# Patient Record
Sex: FEMALE | Race: WHITE | NOT HISPANIC OR LATINO | Employment: FULL TIME | URBAN - METROPOLITAN AREA
[De-identification: names, ages, dates, MRNs, and addresses within clinical notes are randomized per-mention and may not be internally consistent; named-entity substitution may affect disease eponyms.]

---

## 2023-06-01 ENCOUNTER — HOSPITAL ENCOUNTER (EMERGENCY)
Facility: HOSPITAL | Age: 58
Discharge: HOME | End: 2023-06-01
Attending: EMERGENCY MEDICINE
Payer: COMMERCIAL

## 2023-06-01 VITALS
OXYGEN SATURATION: 93 % | HEIGHT: 65 IN | DIASTOLIC BLOOD PRESSURE: 86 MMHG | TEMPERATURE: 97.9 F | SYSTOLIC BLOOD PRESSURE: 129 MMHG | WEIGHT: 166.23 LBS | HEART RATE: 89 BPM | BODY MASS INDEX: 27.7 KG/M2 | RESPIRATION RATE: 18 BRPM

## 2023-06-01 DIAGNOSIS — F10.929 ALCOHOL INTOXICATION (HCC): Primary | ICD-10-CM

## 2023-06-01 DIAGNOSIS — R74.8 ELEVATED LIVER ENZYMES: ICD-10-CM

## 2023-06-01 LAB
ALBUMIN SERPL BCP-MCNC: 4.4 G/DL (ref 3.5–5)
ALP SERPL-CCNC: 109 U/L (ref 34–104)
ALT SERPL W P-5'-P-CCNC: 94 U/L (ref 7–52)
ANION GAP SERPL CALCULATED.3IONS-SCNC: 13 MMOL/L (ref 4–13)
APTT PPP: 24 SECONDS (ref 23–37)
AST SERPL W P-5'-P-CCNC: 72 U/L (ref 13–39)
ATRIAL RATE: 78 BPM
BASOPHILS # BLD AUTO: 0.03 THOUSANDS/ÂΜL (ref 0–0.1)
BASOPHILS NFR BLD AUTO: 0 % (ref 0–1)
BILIRUB SERPL-MCNC: 0.43 MG/DL (ref 0.2–1)
BUN SERPL-MCNC: 9 MG/DL (ref 5–25)
CALCIUM SERPL-MCNC: 9.5 MG/DL (ref 8.4–10.2)
CHLORIDE SERPL-SCNC: 104 MMOL/L (ref 96–108)
CO2 SERPL-SCNC: 26 MMOL/L (ref 21–32)
CREAT SERPL-MCNC: 0.45 MG/DL (ref 0.6–1.3)
EOSINOPHIL # BLD AUTO: 0.04 THOUSAND/ÂΜL (ref 0–0.61)
EOSINOPHIL NFR BLD AUTO: 1 % (ref 0–6)
ERYTHROCYTE [DISTWIDTH] IN BLOOD BY AUTOMATED COUNT: 13.1 % (ref 11.6–15.1)
ETHANOL SERPL-MCNC: 283 MG/DL
GFR SERPL CREATININE-BSD FRML MDRD: 109 ML/MIN/1.73SQ M
GLUCOSE SERPL-MCNC: 80 MG/DL (ref 65–140)
HCT VFR BLD AUTO: 47.6 % (ref 34.8–46.1)
HGB BLD-MCNC: 16.6 G/DL (ref 11.5–15.4)
IMM GRANULOCYTES # BLD AUTO: 0.01 THOUSAND/UL (ref 0–0.2)
IMM GRANULOCYTES NFR BLD AUTO: 0 % (ref 0–2)
INR PPP: 0.93 (ref 0.84–1.19)
LYMPHOCYTES # BLD AUTO: 2.43 THOUSANDS/ÂΜL (ref 0.6–4.47)
LYMPHOCYTES NFR BLD AUTO: 36 % (ref 14–44)
MCH RBC QN AUTO: 32.5 PG (ref 26.8–34.3)
MCHC RBC AUTO-ENTMCNC: 34.9 G/DL (ref 31.4–37.4)
MCV RBC AUTO: 93 FL (ref 82–98)
MONOCYTES # BLD AUTO: 0.36 THOUSAND/ÂΜL (ref 0.17–1.22)
MONOCYTES NFR BLD AUTO: 5 % (ref 4–12)
NEUTROPHILS # BLD AUTO: 3.98 THOUSANDS/ÂΜL (ref 1.85–7.62)
NEUTS SEG NFR BLD AUTO: 58 % (ref 43–75)
NRBC BLD AUTO-RTO: 0 /100 WBCS
P AXIS: 45 DEGREES
PLATELET # BLD AUTO: 235 THOUSANDS/UL (ref 149–390)
PMV BLD AUTO: 10 FL (ref 8.9–12.7)
POTASSIUM SERPL-SCNC: 3.3 MMOL/L (ref 3.5–5.3)
PR INTERVAL: 186 MS
PROT SERPL-MCNC: 7.5 G/DL (ref 6.4–8.4)
PROTHROMBIN TIME: 12.5 SECONDS (ref 11.6–14.5)
QRS AXIS: 1 DEGREES
QRSD INTERVAL: 100 MS
QT INTERVAL: 420 MS
QTC INTERVAL: 478 MS
RBC # BLD AUTO: 5.11 MILLION/UL (ref 3.81–5.12)
SODIUM SERPL-SCNC: 143 MMOL/L (ref 135–147)
T WAVE AXIS: 2 DEGREES
VENTRICULAR RATE: 78 BPM
WBC # BLD AUTO: 6.85 THOUSAND/UL (ref 4.31–10.16)

## 2023-06-01 PROCEDURE — 80053 COMPREHEN METABOLIC PANEL: CPT | Performed by: EMERGENCY MEDICINE

## 2023-06-01 PROCEDURE — 36415 COLL VENOUS BLD VENIPUNCTURE: CPT | Performed by: EMERGENCY MEDICINE

## 2023-06-01 PROCEDURE — 85025 COMPLETE CBC W/AUTO DIFF WBC: CPT | Performed by: EMERGENCY MEDICINE

## 2023-06-01 PROCEDURE — 85730 THROMBOPLASTIN TIME PARTIAL: CPT | Performed by: EMERGENCY MEDICINE

## 2023-06-01 PROCEDURE — 93005 ELECTROCARDIOGRAM TRACING: CPT

## 2023-06-01 PROCEDURE — 85610 PROTHROMBIN TIME: CPT | Performed by: EMERGENCY MEDICINE

## 2023-06-01 PROCEDURE — 82077 ASSAY SPEC XCP UR&BREATH IA: CPT | Performed by: EMERGENCY MEDICINE

## 2023-06-01 PROCEDURE — 96360 HYDRATION IV INFUSION INIT: CPT

## 2023-06-01 PROCEDURE — 93010 ELECTROCARDIOGRAM REPORT: CPT | Performed by: INTERNAL MEDICINE

## 2023-06-01 PROCEDURE — 99284 EMERGENCY DEPT VISIT MOD MDM: CPT

## 2023-06-01 RX ORDER — CLONAZEPAM 1 MG/1
1 TABLET ORAL 2 TIMES DAILY
COMMUNITY

## 2023-06-01 RX ADMIN — SODIUM CHLORIDE 1000 ML: 0.9 INJECTION, SOLUTION INTRAVENOUS at 14:46

## 2023-06-01 NOTE — ED PROVIDER NOTES
"History  Chief Complaint   Patient presents with   • Alcohol Intoxication     Pt c/o right ear pain and scalp pain  Pt reports taking qty 1 1mg Klonopin and consuming \"not that [employer]     Patient has a history of chronic pain in her ears  She has a documented history of seborrheic dermatitis and chronic noninfective ear pain  Patient also has a history of chronic pain in her back and has been maintained on narcotic pain medication for years  She is currently still on Klonopin and has an alcohol abuse history  She arrives here by ambulance today for ear pain  She states she took some Klonopin and \"not that much alcohol \"  Patient is clearly intoxicated on arrival   She is asking for help          Prior to Admission Medications   Prescriptions Last Dose Informant Patient Reported? Taking? clonazePAM (KlonoPIN) 1 mg tablet  Self, Pharmacy (Specify) Yes Yes   Sig: Take 1 mg by mouth 2 (two) times a day      Facility-Administered Medications: None       History reviewed  No pertinent past medical history  History reviewed  No pertinent surgical history  History reviewed  No pertinent family history  I have reviewed and agree with the history as documented  E-Cigarette/Vaping     E-Cigarette/Vaping Substances     Social History     Tobacco Use   • Smoking status: Never   • Smokeless tobacco: Never   Substance Use Topics   • Alcohol use: Yes     Alcohol/week: 2 0 standard drinks of alcohol     Types: 2 Shots of liquor per week   • Drug use: Never       Review of Systems   Constitutional: Negative for chills and fever  HENT: Positive for ear pain  Negative for congestion and sore throat  Eyes: Negative for visual disturbance  Respiratory: Negative for shortness of breath  Cardiovascular: Negative for chest pain  Gastrointestinal: Negative for abdominal pain and vomiting  Genitourinary: Negative for dysuria  Musculoskeletal: Positive for back pain and gait problem     Skin: Positive " for rash  Neurological: Positive for weakness  Negative for seizures and headaches  Hematological: Does not bruise/bleed easily  Psychiatric/Behavioral: Positive for dysphoric mood  Negative for confusion  All other systems reviewed and are negative  Physical Exam  Physical Exam  Vitals and nursing note reviewed  Constitutional:       Appearance: Normal appearance  HENT:      Head: Normocephalic  Right Ear: Tympanic membrane and external ear normal       Left Ear: Tympanic membrane and external ear normal       Nose: Nose normal       Mouth/Throat:      Pharynx: Oropharynx is clear  Eyes:      Conjunctiva/sclera: Conjunctivae normal    Cardiovascular:      Rate and Rhythm: Normal rate and regular rhythm  Pulses: Normal pulses  Pulmonary:      Effort: Pulmonary effort is normal       Breath sounds: Normal breath sounds  Abdominal:      General: Bowel sounds are normal       Palpations: Abdomen is soft  Tenderness: There is no abdominal tenderness  Musculoskeletal:         General: Normal range of motion  Cervical back: Normal range of motion  Right lower leg: No edema  Left lower leg: No edema  Skin:     General: Skin is warm and dry  Capillary Refill: Capillary refill takes less than 2 seconds  Neurological:      General: No focal deficit present  Mental Status: She is alert  Psychiatric:      Comments: Patient appears intoxicated  She is repeating herself and occasionally slurring her words           Vital Signs  ED Triage Vitals [06/01/23 1359]   Temperature Pulse Respirations Blood Pressure SpO2   97 9 °F (36 6 °C) 94 18 137/93 95 %      Temp Source Heart Rate Source Patient Position - Orthostatic VS BP Location FiO2 (%)   Oral Monitor Lying Right arm --      Pain Score       10 - Worst Possible Pain           Vitals:    06/01/23 1448 06/01/23 1500 06/01/23 1530 06/01/23 1600   BP: 128/79 138/86 134/79 133/71   Pulse: 79 77 79 84   Patient Position - Orthostatic VS:             Visual Acuity      ED Medications  Medications   sodium chloride 0 9 % bolus 1,000 mL (0 mL Intravenous Stopped 6/1/23 1534)       Diagnostic Studies  Results Reviewed     Procedure Component Value Units Date/Time    Ethanol [[id number]]  (Abnormal) Collected: 06/01/23 1444    Lab Status: Final result Specimen: Blood from Arm, Left Updated: 06/01/23 1519     Ethanol Lvl 283 mg/dL     Comprehensive metabolic panel [[id number]]  (Abnormal) Collected: 06/01/23 1444    Lab Status: Final result Specimen: Blood from Arm, Left Updated: 06/01/23 1519     Sodium 143 mmol/L      Potassium 3 3 mmol/L      Chloride 104 mmol/L      CO2 26 mmol/L      ANION GAP 13 mmol/L      BUN 9 mg/dL      Creatinine 0 45 mg/dL      Glucose 80 mg/dL      Calcium 9 5 mg/dL      AST 72 U/L      ALT 94 U/L      Alkaline Phosphatase 109 U/L      Total Protein 7 5 g/dL      Albumin 4 4 g/dL      Total Bilirubin 0 43 mg/dL      eGFR 109 ml/min/1 73sq m     Narrative:      [place] guidelines for Chronic Kidney Disease (CKD):   •  Stage 1 with normal or high GFR (GFR > 90 mL/min/1 73 square meters)  •  Stage 2 Mild CKD (GFR = 60-89 mL/min/1 73 square meters)  •  Stage 3A Moderate CKD (GFR = 45-59 mL/min/1 73 square meters)  •  Stage 3B Moderate CKD (GFR = 30-44 mL/min/1 73 square meters)  •  Stage 4 Severe CKD (GFR = 15-29 mL/min/1 73 square meters)  •  Stage 5 End Stage CKD (GFR <15 mL/min/1 73 square meters)  Note: GFR calculation is accurate only with a steady state creatinine    Protime-INR [[id number]]  (Normal) Collected: 06/01/23 1444    Lab Status: Final result Specimen: Blood from Arm, Left Updated: 06/01/23 1508     Protime 12 5 seconds      INR 0 93    APTT [[id number]]  (Normal) Collected: 06/01/23 1444    Lab Status: Final result Specimen: Blood from Arm, Left Updated: 06/01/23 1508     PTT 24 seconds     CBC and differential [[id number]]  (Abnormal) Collected: 06/01/23 1444 Lab Status: Final result Specimen: Blood from Arm, Left Updated: 06/01/23 1452     WBC 6 85 Thousand/uL      RBC 5 11 Million/uL      Hemoglobin 16 6 g/dL      Hematocrit 47 6 %      MCV 93 fL      MCH 32 5 pg      MCHC 34 9 g/dL      RDW 13 1 %      MPV 10 0 fL      Platelets 060 Thousands/uL      nRBC 0 /100 WBCs      Neutrophils Relative 58 %      Immat GRANS % 0 %      Lymphocytes Relative 36 %      Monocytes Relative 5 %      Eosinophils Relative 1 %      Basophils Relative 0 %      Neutrophils Absolute 3 98 Thousands/µL      Immature Grans Absolute 0 01 Thousand/uL      Lymphocytes Absolute 2 43 Thousands/µL      Monocytes Absolute 0 36 Thousand/µL      Eosinophils Absolute 0 04 Thousand/µL      Basophils Absolute 0 03 Thousands/µL     Rapid drug screen, urine [[id number]]     Lab Status: No result Specimen: Urine     UA (URINE) with reflex to Scope [[id number]]     Lab Status: No result Specimen: Urine                  No orders to display              Procedures  Procedures         ED Course                               SBIRT 22yo+    Flowsheet Row Most Recent Value   Initial Alcohol Screen: US AUDIT-C     1  How often do you have a drink containing alcohol? 0 Filed at: 06/01/2023 1405   2  How many drinks containing alcohol do you have on a typical day you are drinking? 2 Filed at: 06/01/2023 1405   3a  Male UNDER 65: How often do you have five or more drinks on one occasion? 0 Filed at: 06/01/2023 1405   3b  FEMALE Any Age, or MALE 65+: How often do you have 4 or more drinks on one occassion? 0 Filed at: 06/01/2023 1405   Audit-C Score 2 Filed at: 06/01/2023 1405   KELLEN: How many times in the past year have you    Used an illegal drug or used a prescription medication for non-medical reasons? Never Filed at: 06/01/2023 1405                    Medical Decision Making  Patient has been self-medicating with potentially dangerous medication  She states she wants help and will accept detox      Detox unit states patient will have to accept to be off Klonopin during her detox  Patient was sleeping soundly, and when I awakened her to ask if she is willing to except being off Klonopin and alcohol, patient states she went to think about it  However she continues to ask when I am going to do for chronic ear pain or if I am going to do a culture of it  I explained again this is a chronic condition which is not infectious  I can refer her to ENT if she likes  Patient again reiterates she does not drink alcohol very much and cannot explain how her alcohol level was 283    Amount and/or Complexity of Data Reviewed  Labs: ordered  Disposition  Final diagnoses:   None     ED Disposition     None      Follow-up Information    None         Patient's Medications   Discharge Prescriptions    No medications on file       No discharge procedures on file      PDMP Review     None          ED Provider  Electronically Signed by           Ellen Bryson MD  06/01/23 2009

## 2023-09-26 ENCOUNTER — HOSPITAL ENCOUNTER (EMERGENCY)
Facility: HOSPITAL | Age: 58
Discharge: HOME | End: 2023-09-26
Attending: EMERGENCY MEDICINE
Payer: COMMERCIAL

## 2023-09-26 VITALS
HEART RATE: 80 BPM | SYSTOLIC BLOOD PRESSURE: 156 MMHG | TEMPERATURE: 97.9 F | RESPIRATION RATE: 18 BRPM | OXYGEN SATURATION: 95 % | HEIGHT: 65 IN | WEIGHT: 166.67 LBS | DIASTOLIC BLOOD PRESSURE: 87 MMHG | BODY MASS INDEX: 27.77 KG/M2

## 2023-09-26 DIAGNOSIS — F10.10 ALCOHOL ABUSE: Primary | ICD-10-CM

## 2023-09-26 DIAGNOSIS — F13.10 BENZODIAZEPINE ABUSE (HCC): ICD-10-CM

## 2023-09-26 DIAGNOSIS — G89.29 CHRONIC PAIN: ICD-10-CM

## 2023-09-26 LAB
ALBUMIN SERPL BCP-MCNC: 4.3 G/DL (ref 3.5–5)
ALP SERPL-CCNC: 101 U/L (ref 34–104)
ALT SERPL W P-5'-P-CCNC: 30 U/L (ref 7–52)
ANION GAP SERPL CALCULATED.3IONS-SCNC: 16 MMOL/L
AST SERPL W P-5'-P-CCNC: 34 U/L (ref 13–39)
BASOPHILS # BLD AUTO: 0.04 THOUSANDS/ÂΜL (ref 0–0.1)
BASOPHILS NFR BLD AUTO: 0 % (ref 0–1)
BILIRUB SERPL-MCNC: 0.79 MG/DL (ref 0.2–1)
BUN SERPL-MCNC: 14 MG/DL (ref 5–25)
CALCIUM SERPL-MCNC: 9.4 MG/DL (ref 8.4–10.2)
CHLORIDE SERPL-SCNC: 100 MMOL/L (ref 96–108)
CO2 SERPL-SCNC: 22 MMOL/L (ref 21–32)
CREAT SERPL-MCNC: 0.41 MG/DL (ref 0.6–1.3)
EOSINOPHIL # BLD AUTO: 0.04 THOUSAND/ÂΜL (ref 0–0.61)
EOSINOPHIL NFR BLD AUTO: 0 % (ref 0–6)
ERYTHROCYTE [DISTWIDTH] IN BLOOD BY AUTOMATED COUNT: 12.7 % (ref 11.6–15.1)
ETHANOL SERPL-MCNC: 34 MG/DL
GFR SERPL CREATININE-BSD FRML MDRD: 112 ML/MIN/1.73SQ M
GLUCOSE SERPL-MCNC: 102 MG/DL (ref 65–140)
HCT VFR BLD AUTO: 42.3 % (ref 34.8–46.1)
HGB BLD-MCNC: 15.2 G/DL (ref 11.5–15.4)
IMM GRANULOCYTES # BLD AUTO: 0.03 THOUSAND/UL (ref 0–0.2)
IMM GRANULOCYTES NFR BLD AUTO: 0 % (ref 0–2)
LIPASE SERPL-CCNC: 7 U/L (ref 11–82)
LYMPHOCYTES # BLD AUTO: 1.5 THOUSANDS/ÂΜL (ref 0.6–4.47)
LYMPHOCYTES NFR BLD AUTO: 15 % (ref 14–44)
MCH RBC QN AUTO: 34.5 PG (ref 26.8–34.3)
MCHC RBC AUTO-ENTMCNC: 35.9 G/DL (ref 31.4–37.4)
MCV RBC AUTO: 96 FL (ref 82–98)
MONOCYTES # BLD AUTO: 0.36 THOUSAND/ÂΜL (ref 0.17–1.22)
MONOCYTES NFR BLD AUTO: 4 % (ref 4–12)
NEUTROPHILS # BLD AUTO: 8.39 THOUSANDS/ÂΜL (ref 1.85–7.62)
NEUTS SEG NFR BLD AUTO: 81 % (ref 43–75)
NRBC BLD AUTO-RTO: 0 /100 WBCS
PLATELET # BLD AUTO: 176 THOUSANDS/UL (ref 149–390)
PMV BLD AUTO: 10 FL (ref 8.9–12.7)
POTASSIUM SERPL-SCNC: 3.2 MMOL/L (ref 3.5–5.3)
PROT SERPL-MCNC: 7.1 G/DL (ref 6.4–8.4)
RBC # BLD AUTO: 4.4 MILLION/UL (ref 3.81–5.12)
SARS-COV-2 RNA RESP QL NAA+PROBE: NEGATIVE
SODIUM SERPL-SCNC: 138 MMOL/L (ref 135–147)
WBC # BLD AUTO: 10.36 THOUSAND/UL (ref 4.31–10.16)

## 2023-09-26 PROCEDURE — 87635 SARS-COV-2 COVID-19 AMP PRB: CPT | Performed by: EMERGENCY MEDICINE

## 2023-09-26 PROCEDURE — 99284 EMERGENCY DEPT VISIT MOD MDM: CPT

## 2023-09-26 PROCEDURE — 85025 COMPLETE CBC W/AUTO DIFF WBC: CPT | Performed by: EMERGENCY MEDICINE

## 2023-09-26 PROCEDURE — 82077 ASSAY SPEC XCP UR&BREATH IA: CPT | Performed by: EMERGENCY MEDICINE

## 2023-09-26 PROCEDURE — 80053 COMPREHEN METABOLIC PANEL: CPT | Performed by: EMERGENCY MEDICINE

## 2023-09-26 PROCEDURE — 36415 COLL VENOUS BLD VENIPUNCTURE: CPT | Performed by: EMERGENCY MEDICINE

## 2023-09-26 PROCEDURE — 99284 EMERGENCY DEPT VISIT MOD MDM: CPT | Performed by: EMERGENCY MEDICINE

## 2023-09-26 PROCEDURE — 83690 ASSAY OF LIPASE: CPT | Performed by: EMERGENCY MEDICINE

## 2023-09-26 RX ORDER — ACETAMINOPHEN 325 MG/1
650 TABLET ORAL ONCE
Status: COMPLETED | OUTPATIENT
Start: 2023-09-26 | End: 2023-09-26

## 2023-09-26 RX ORDER — DIAZEPAM 5 MG/1
5 TABLET ORAL ONCE
Status: COMPLETED | OUTPATIENT
Start: 2023-09-26 | End: 2023-09-26

## 2023-09-26 RX ADMIN — DIAZEPAM 5 MG: 5 TABLET ORAL at 13:18

## 2023-09-26 RX ADMIN — ACETAMINOPHEN 650 MG: 325 TABLET ORAL at 08:11

## 2023-09-26 NOTE — ED NOTES
9/26/23 @ 60:  59year-old female brought to ED due to alcohol abuse and made what was believed to be a suicidal statement. Patient admits to alcohol use, but denies any suicidal ideations, plan or intent. Patient denies any mental health history, but does admit to long term alcohol abuse. Patient says she started drinking 1 bottle of wine daily, but says, "that wasn't working to control my pain anymore, so now I'm drinking a half bottle of Cezar Leif daily. I drink because I have severe ear pain but no one is able to find anything."  Although this may be a delusion, patient denies and didn't reveal any psychiatric issue that would require inpatient treatment, however, patient did reveal significant criteria for alcohol treatment. Patient says she will begin to detox by vomiting, sweating, and shaking, none are currently presenting. PES will discuss with ED MD and attempt [facility] detox. If not appropriate or no bed availability, PES will reach out to [facility] to assist.  Please see copy of crisis assessment for further details.     Cj Tamayo MS

## 2023-09-26 NOTE — ED NOTES
9/26/23 @ 1214:  PES requested ED MD to check if [facility] would accept patient for detox. Felix Simpson, [id number] [facility]    1320: ED MD reports that patient "doesn't want to go to detox because she would also have to discontinue Benzodiazepines and she doesn't want to do that."  PES discussed with patient and she isn't interested in going and wants to go home. Madhav MS    1430: PES informed that patient changed her mind again. Madhav MS    1440: PES called [facility] and requested [facility] assistance.   Madhav MS    1450: PES notified by ED RN that changed her mind again; PES provided outpatient resource list.  Felix Simpson, MS

## 2023-09-26 NOTE — ED NOTES
Patient is resting comfortably. No distress noted at this time.      Rosalie Macario RN  09/26/23 [id number]

## 2023-09-28 NOTE — ED PROVIDER NOTES
Final Diagnosis:  1. Alcohol abuse    2. Benzodiazepine abuse ([address])    3. Chronic pain        Chief Complaint   Patient presents with   • Alcohol Intoxication     Pt took half a bottle of whiskey 6 hours ago, per EMS they found pt passed out. Also pt took clonidine this morning. HPI  Patient was brought in by EMS. Alcohol intox. She's only able to say some vague things. "my  is trying to kill me, put that on your charts". She notes she drank whisky. No other intoxicant. No external signs of trauma. Some poorly communicated per EMS vague SI. kirti believes son to have initiated EMS. She aj to morning and when I readdress she says she's not suicidal, never made threats, and son does this to her a lot. She doesn't wish to have detox per my eval. She has no complaints other than needing a drink. I provide her w/ water. She drinks some then drops on the floor and returns to sleep. No signs of withedrawal at this time. She's still legally intox so will sign out to day team and crisis to reeval when legally sober. EMS reports if applicable: N/A     - Previous charting underwent limited review with attention to last ED visits, labs, ekgs, and prior imaging.   Chart review reveals :     Admission on 06/01/2023, Discharged on 06/01/2023   Component Date Value Ref Range Status   • WBC 06/01/2023 6.85  4.31 - 10.16 Thousand/uL Final   • RBC 06/01/2023 5.11  3.81 - 5.12 Million/uL Final   • Hemoglobin 06/01/2023 16.6 (H)  11.5 - 15.4 g/dL Final   • Hematocrit 06/01/2023 47.6 (H)  34.8 - 46.1 % Final   • MCV 06/01/2023 93  82 - 98 fL Final   • MCH 06/01/2023 32.5  26.8 - 34.3 pg Final   • MCHC 06/01/2023 34.9  31.4 - 37.4 g/dL Final   • RDW 06/01/2023 13.1  11.6 - 15.1 % Final   • MPV 06/01/2023 10.0  8.9 - 12.7 fL Final   • Platelets 71/76/9322 235  149 - 390 Thousands/uL Final   • nRBC 06/01/2023 0  /100 WBCs Final   • Neutrophils Relative 06/01/2023 58  43 - 75 % Final   • Immat GRANS % 06/01/2023 0  0 - 2 % Final   • Lymphocytes Relative 06/01/2023 36  14 - 44 % Final   • Monocytes Relative 06/01/2023 5  4 - 12 % Final   • Eosinophils Relative 06/01/2023 1  0 - 6 % Final   • Basophils Relative 06/01/2023 0  0 - 1 % Final   • Neutrophils Absolute 06/01/2023 3.98  1.85 - 7.62 Thousands/µL Final   • Immature Grans Absolute 06/01/2023 0.01  0.00 - 0.20 Thousand/uL Final   • Lymphocytes Absolute 06/01/2023 2.43  0.60 - 4.47 Thousands/µL Final   • Monocytes Absolute 06/01/2023 0.36  0.17 - 1.22 Thousand/µL Final   • Eosinophils Absolute 06/01/2023 0.04  0.00 - 0.61 Thousand/µL Final   • Basophils Absolute 06/01/2023 0.03  0.00 - 0.10 Thousands/µL Final   • Protime 06/01/2023 12.5  11.6 - 14.5 seconds Final   • INR 06/01/2023 0.93  0.84 - 1.19 Final   • PTT 06/01/2023 24  23 - 37 seconds Final    Therapeutic Heparin Range =  60-90 seconds   • Sodium 06/01/2023 143  135 - 147 mmol/L Final   • Potassium 06/01/2023 3.3 (L)  3.5 - 5.3 mmol/L Final   • Chloride 06/01/2023 104  96 - 108 mmol/L Final   • CO2 06/01/2023 26  21 - 32 mmol/L Final   • ANION GAP 06/01/2023 13  4 - 13 mmol/L Final   • BUN 06/01/2023 9  5 - 25 mg/dL Final   • Creatinine 06/01/2023 0.45 (L)  0.60 - 1.30 mg/dL Final    Standardized to IDMS reference method   • Glucose 06/01/2023 80  65 - 140 mg/dL Final    If the patient is fasting, the ADA then defines impaired fasting glucose as > 100 mg/dL and diabetes as > or equal to 123 mg/dL. • Calcium 06/01/2023 9.5  8.4 - 10.2 mg/dL Final   • AST 06/01/2023 72 (H)  13 - 39 U/L Final   • ALT 06/01/2023 94 (H)  7 - 52 U/L Final    Specimen collection should occur prior to Sulfasalazine administration due to the potential for falsely depressed results.     • Alkaline Phosphatase 06/01/2023 109 (H)  34 - 104 U/L Final   • Total Protein 06/01/2023 7.5  6.4 - 8.4 g/dL Final   • Albumin 06/01/2023 4.4  3.5 - 5.0 g/dL Final   • Total Bilirubin 06/01/2023 0.43  0.20 - 1.00 mg/dL Final    Use of this assay is not recommended for patients undergoing treatment with eltrombopag due to the potential for falsely elevated results. N-acetyl-p-benzoquinone imine (metabolite of Acetaminophen) will generate erroneously low results in samples for patients that have taken an overdose of Acetaminophen. • eGFR 06/01/2023 109  ml/min/1.73sq m Final   • Ethanol Lvl 06/01/2023 283 (H)  <10 mg/dL Final   • Ventricular Rate 06/01/2023 78  BPM Final   • Atrial Rate 06/01/2023 78  BPM Final   • AL Interval 06/01/2023 186  ms Final   • QRSD Interval 06/01/2023 100  ms Final   • QT Interval 06/01/2023 420  ms Final   • QTC Interval 06/01/2023 478  ms Final   • P Axis 06/01/2023 45  degrees Final   • QRS Axis 06/01/2023 1  degrees Final   • T Wave [place] 06/01/2023 2  degrees Final       - No language barrier.   - History obtained from patient . - There are no limitations to the history obtained. - Discuss patient's care, with patient permission or by chart review, with      PMH:   has no past medical history on file. PSH:   has no past surgical history on file. Social History:  Tobacco Use: Low Risk  (9/26/2023)    Patient History    • Smoking Tobacco Use: Never    • Smokeless Tobacco Use: Never    • Passive Exposure: Not on file     Alcohol Use: Not on file     No illicit use       ROS:  Pertinent positives/negatives: Aby Collins Some ROS may be present in the HPI and would take precedent over these standard questions asked below. Review of Systems   Unable to perform ROS: Mental status change      intox  PE:     Physical exam highlights:   Physical Exam       Vitals:    09/26/23 0714 09/26/23 1100 09/26/23 1130 09/26/23 1200   BP: 132/72 143/76 138/72 156/87   BP Location:       Pulse: 80      Resp: 18      Temp: 97.9 °F (36.6 °C)      TempSrc:       SpO2: 95%      Weight:       Height:         Vitals reviewed by me. Nursing note reviewed  Chaperone present for all sensitive exam.  Const: No acute distress. Intoxicated. Nontoxic.  Not diaphoretic. HEENT: External ears normal. No protrusion drainage swelling. Nose normal. No drainage/traumatic deformity. MMM. Mouth with baseline/symmetric movement. No trismus. No signs of trauma. Eyes: No squinting. No icterus. No tearing/swelling/drainage. Tracks through the room with normal EOM. Neck: ROM normal. No rigidity. No meningismus. Cards: Rate as per vitals Compared to monitor sinus unless documented. Regular Well perfused. Pulm: able to verbalize without additional effort. Effort and excursion normal. No distress. No audible wheezing/ stridor. Normal resp rate without retraction or change in work of breathing. Abd: No distension beyond baseline. No fluctuant wave. Patient without peritoneal pain with shifting/bumping the bed. MSK: ROM normal baseline. No deformity. No contractures from baseline. Skin: No new rashes visible. Well perfused. No wounds visualized on exposed skin  Neuro: Nonfocal. Baseline. CN grossly intact. Moving all four with coordination. Psych: intoxicated. Cooperative. Denies SI HI or AVH once closer to sobriety. Denies " is trying to kill her"        A:  - Nursing note reviewed.     Ddx and MDM  Considered diagnoses    alcohool intox      R/o SI when sober  Discuss detox when sober         My conversation with consultant reveals: NA       Decision rules:                           My read of the XR/CT scan reveals:  NA   No orders to display       Orders Placed This Encounter   Procedures   • COVID only   • CBC and differential   • Comprehensive metabolic panel   • Lipase   • Ethanol     Labs Reviewed   CBC AND DIFFERENTIAL - Abnormal       Result Value Ref Range Status    WBC 10.36 (*) 4.31 - 10.16 Thousand/uL Final    RBC 4.40  3.81 - 5.12 Million/uL Final    Hemoglobin 15.2  11.5 - 15.4 g/dL Final    Hematocrit 42.3  34.8 - 46.1 % Final    MCV 96  82 - 98 fL Final    MCH 34.5 (*) 26.8 - 34.3 pg Final    MCHC 35.9  31.4 - 37.4 g/dL Final    RDW 12.7  11.6 - 15.1 % Final    MPV 10.0  8.9 - 12.7 fL Final    Platelets 197  402 - 390 Thousands/uL Final    nRBC 0  /100 WBCs Final    Neutrophils Relative 81 (*) 43 - 75 % Final    Immat GRANS % 0  0 - 2 % Final    Lymphocytes Relative 15  14 - 44 % Final    Monocytes Relative 4  4 - 12 % Final    Eosinophils Relative 0  0 - 6 % Final    Basophils Relative 0  0 - 1 % Final    Neutrophils Absolute 8.39 (*) 1.85 - 7.62 Thousands/µL Final    Immature Grans Absolute 0.03  0.00 - 0.20 Thousand/uL Final    Lymphocytes Absolute 1.50  0.60 - 4.47 Thousands/µL Final    Monocytes Absolute 0.36  0.17 - 1.22 Thousand/µL Final    Eosinophils Absolute 0.04  0.00 - 0.61 Thousand/µL Final    Basophils Absolute 0.04  0.00 - 0.10 Thousands/µL Final   COMPREHENSIVE METABOLIC PANEL - Abnormal    Sodium 138  135 - 147 mmol/L Final    Potassium 3.2 (*) 3.5 - 5.3 mmol/L Final    Chloride 100  96 - 108 mmol/L Final    CO2 22  21 - 32 mmol/L Final    ANION GAP 16  mmol/L Final    BUN 14  5 - 25 mg/dL Final    Creatinine 0.41 (*) 0.60 - 1.30 mg/dL Final    Comment: Standardized to IDMS reference method    Glucose 102  65 - 140 mg/dL Final    Comment: If the patient is fasting, the ADA then defines impaired fasting glucose as > 100 mg/dL and diabetes as > or equal to 123 mg/dL. Calcium 9.4  8.4 - 10.2 mg/dL Final    AST 34  13 - 39 U/L Final    ALT 30  7 - 52 U/L Final    Comment: Specimen collection should occur prior to Sulfasalazine administration due to the potential for falsely depressed results. Alkaline Phosphatase 101  34 - 104 U/L Final    Total Protein 7.1  6.4 - 8.4 g/dL Final    Albumin 4.3  3.5 - 5.0 g/dL Final    Total Bilirubin 0.79  0.20 - 1.00 mg/dL Final    Comment: Use of this assay is not recommended for patients undergoing treatment with eltrombopag due to the potential for falsely elevated results.   N-acetyl-p-benzoquinone imine (metabolite of Acetaminophen) will generate erroneously low results in samples for patients that have taken an overdose of Acetaminophen. eGFR 112  ml/min/1.73sq m Final    Narrative:     [facility] guidelines for Chronic Kidney Disease (CKD):   •  Stage 1 with normal or high GFR (GFR > 90 mL/min/1.73 square meters)  •  Stage 2 Mild CKD (GFR = 60-89 mL/min/1.73 square meters)  •  Stage 3A Moderate CKD (GFR = 45-59 mL/min/1.73 square meters)  •  Stage 3B Moderate CKD (GFR = 30-44 mL/min/1.73 square meters)  •  Stage 4 Severe CKD (GFR = 15-29 mL/min/1.73 square meters)  •  Stage 5 End Stage CKD (GFR <15 mL/min/1.73 square meters)  Note: GFR calculation is accurate only with a steady state creatinine   LIPASE - Abnormal    Lipase 7 (*) 11 - 82 u/L Final   NOVEL CORONAVIRUS (COVID-19), PCR [facility] - Normal    SARS-CoV-2 Negative  Negative Final    Narrative:     FOR PEDIATRIC PATIENTS - copy/paste COVID Guidelines URL to browser: [email or web address]    SARS-CoV-2 assay is a Nucleic Acid Amplification assay intended for the  qualitative detection of nucleic acid from SARS-CoV-2 in nasopharyngeal  swabs. Results are for the presumptive identification of SARS-CoV-2 RNA. Positive results are indicative of infection with SARS-CoV-2, the virus  causing COVID-19, but do not rule out bacterial infection or co-infection  with other viruses. Laboratories within the [facility] and its  territories are required to report all positive results to the appropriate  public health authorities. Negative results do not preclude SARS-CoV-2  infection and should not be used as the sole basis for treatment or other  patient management decisions. Negative results must be combined with  clinical observations, patient history, and epidemiological information. This test has not been FDA cleared or approved. This test has been authorized by FDA under an Emergency Use Authorization  (EUA).  This test is only authorized for the duration of time the  declaration that circumstances exist justifying the authorization of the  emergency use of an in vitro diagnostic tests for detection of SARS-CoV-2  virus and/or diagnosis of COVID-19 infection under section 564(b)(1) of  the Act, 21 U. S.C. 294NOM-7(D)(3), unless the authorization is terminated  or revoked sooner. The test has been validated but independent review by FDA  and CLIA is pending. Test performed using [employer] GeneXpert: This RT-PCR assay targets N2,  a region unique to SARS-CoV-2. A conserved region in the E-gene was chosen  for pan-Sarbecovirus detection which includes SARS-CoV-2. According to CMS-2020-01-R, this platform meets the definition of high-throughput technology. *Each of these labs was reviewed. Particular standout labs will be noted in the ED Course above     Final Diagnosis:  1. Alcohol abuse    2. Benzodiazepine abuse ([address])    3. Chronic pain          P:  - hospital tx includes   Medications   acetaminophen (TYLENOL) tablet 650 mg (650 mg Oral Given 9/26/23 0811)   diazepam (VALIUM) tablet 5 mg (5 mg Oral Given 9/26/23 1318)         - dispositio  Time reflects when diagnosis was documented in both MDM as applicable and the Disposition within this note     Time User Action Codes Description Comment    9/26/2023  1:51 PM Roxanna GUEVARA Add [F10.10] Alcohol abuse     9/26/2023  1:51 PM Roxanna GUEVARA Add [F13.10] Benzodiazepine abuse ([address])     9/26/2023  1:52 PM Amelie Buck Add [G89.29] Chronic pain       ED Disposition     ED Disposition   Discharge    Condition   Stable    Date/Time   Tue Sep 26, 2023  1:51 PM    Comment   Nichole Winters discharge to home/self care. Follow-up Information     Follow up With Specialties Details Why Contact Info    [facility]  Schedule an appointment as soon as possible for a visit   [phone]            - patient will call their PCP to let them know they were in the emergency department.  We discuss return precautions and patient is agreeable with plan and aformentioned disposition. - additional treatment intended, if consistent with primary provider:  - patient to follow with :      Discharge Medication List as of 9/26/2023  1:52 PM      CONTINUE these medications which have NOT CHANGED    Details   clonazePAM (KlonoPIN) 1 mg tablet Take 1 mg by mouth 2 (two) times a day, Historical Med           No discharge procedures on file. Prior to Admission Medications   Prescriptions Last Dose Informant Patient Reported? Taking? clonazePAM (KlonoPIN) 1 mg tablet  Self, Pharmacy (Specify) Yes No   Sig: Take 1 mg by mouth 2 (two) times a day      Facility-Administered Medications: None       Portions of the record may have been created with voice recognition software. Occasional wrong word or "sound a like" substitutions may have occurred due to the inherent limitations of voice recognition software. Read the chart carefully and recognize, using context, where substitutions have occurred.     Electronically signed by:  MD Ann Marie Miller MD  09/28/23 3805

## 2024-04-28 ENCOUNTER — APPOINTMENT (EMERGENCY)
Dept: RADIOLOGY | Facility: HOSPITAL | Age: 59
End: 2024-04-28
Payer: COMMERCIAL

## 2024-04-28 ENCOUNTER — HOSPITAL ENCOUNTER (EMERGENCY)
Facility: HOSPITAL | Age: 59
Discharge: HOME | End: 2024-04-28
Attending: STUDENT IN AN ORGANIZED HEALTH CARE EDUCATION/TRAINING PROGRAM | Admitting: EMERGENCY MEDICINE
Payer: COMMERCIAL

## 2024-04-28 VITALS
OXYGEN SATURATION: 91 % | HEIGHT: 65 IN | HEART RATE: 86 BPM | SYSTOLIC BLOOD PRESSURE: 131 MMHG | RESPIRATION RATE: 17 BRPM | TEMPERATURE: 98.5 F | BODY MASS INDEX: 27.49 KG/M2 | WEIGHT: 165 LBS | DIASTOLIC BLOOD PRESSURE: 75 MMHG

## 2024-04-28 DIAGNOSIS — B37.9 CANDIDIASIS: ICD-10-CM

## 2024-04-28 DIAGNOSIS — F10.920 ALCOHOLIC INTOXICATION WITHOUT COMPLICATION (HCC): Primary | ICD-10-CM

## 2024-04-28 PROBLEM — F10.10 ALCOHOL USE DISORDER, MILD, ABUSE: Status: ACTIVE | Noted: 2021-11-05

## 2024-04-28 PROBLEM — F41.1 GENERALIZED ANXIETY DISORDER: Status: ACTIVE | Noted: 2021-11-05

## 2024-04-28 PROBLEM — I10 HYPERTENSION: Status: ACTIVE | Noted: 2021-11-04

## 2024-04-28 PROBLEM — G89.29 CHRONIC LOW BACK PAIN: Status: ACTIVE | Noted: 2021-11-04

## 2024-04-28 PROBLEM — M54.50 CHRONIC LOW BACK PAIN: Status: ACTIVE | Noted: 2021-11-04

## 2024-04-28 LAB
ALBUMIN SERPL BCP-MCNC: 3.6 G/DL (ref 3.5–5)
ALP SERPL-CCNC: 85 U/L (ref 34–104)
ALT SERPL W P-5'-P-CCNC: 17 U/L (ref 7–52)
AMPHETAMINES SERPL QL SCN: NEGATIVE
ANION GAP SERPL CALCULATED.3IONS-SCNC: 7 MMOL/L (ref 4–13)
ARTERIAL PATENCY WRIST A: YES
AST SERPL W P-5'-P-CCNC: 26 U/L (ref 13–39)
BARBITURATES UR QL: POSITIVE
BASE EXCESS BLDA CALC-SCNC: -3.5 MMOL/L
BASOPHILS # BLD AUTO: 0.05 THOUSANDS/ÂΜL (ref 0–0.1)
BASOPHILS NFR BLD AUTO: 1 % (ref 0–1)
BENZODIAZ UR QL: NEGATIVE
BILIRUB SERPL-MCNC: 0.23 MG/DL (ref 0.2–1)
BNP SERPL-MCNC: 72 PG/ML (ref 0–100)
BODY TEMPERATURE: 98.5 DEGREES FEHRENHEIT
BUN SERPL-MCNC: 16 MG/DL (ref 5–25)
CALCIUM SERPL-MCNC: 8.6 MG/DL (ref 8.4–10.2)
CARDIAC TROPONIN I PNL SERPL HS: 3 NG/L
CHLORIDE SERPL-SCNC: 109 MMOL/L (ref 96–108)
CK SERPL-CCNC: 38 U/L (ref 26–192)
CO2 SERPL-SCNC: 26 MMOL/L (ref 21–32)
COCAINE UR QL: NEGATIVE
CREAT SERPL-MCNC: 0.56 MG/DL (ref 0.6–1.3)
EOSINOPHIL # BLD AUTO: 0.21 THOUSAND/ÂΜL (ref 0–0.61)
EOSINOPHIL NFR BLD AUTO: 4 % (ref 0–6)
ERYTHROCYTE [DISTWIDTH] IN BLOOD BY AUTOMATED COUNT: 12.1 % (ref 11.6–15.1)
ETHANOL SERPL-MCNC: 306 MG/DL
FENTANYL UR QL SCN: NEGATIVE
FLUAV RNA RESP QL NAA+PROBE: NEGATIVE
FLUBV RNA RESP QL NAA+PROBE: NEGATIVE
GFR SERPL CREATININE-BSD FRML MDRD: 100 ML/MIN/1.73SQ M
GLUCOSE SERPL-MCNC: 108 MG/DL (ref 65–140)
HCO3 BLDA-SCNC: 21.7 MMOL/L (ref 22–28)
HCT VFR BLD AUTO: 36.8 % (ref 34.8–46.1)
HGB BLD-MCNC: 12.6 G/DL (ref 11.5–15.4)
HYDROCODONE UR QL SCN: NEGATIVE
IMM GRANULOCYTES # BLD AUTO: 0.01 THOUSAND/UL (ref 0–0.2)
IMM GRANULOCYTES NFR BLD AUTO: 0 % (ref 0–2)
LIPASE SERPL-CCNC: 18 U/L (ref 11–82)
LYMPHOCYTES # BLD AUTO: 2.3 THOUSANDS/ÂΜL (ref 0.6–4.47)
LYMPHOCYTES NFR BLD AUTO: 48 % (ref 14–44)
MAGNESIUM SERPL-MCNC: 1.7 MG/DL (ref 1.9–2.7)
MCH RBC QN AUTO: 34.2 PG (ref 26.8–34.3)
MCHC RBC AUTO-ENTMCNC: 34.2 G/DL (ref 31.4–37.4)
MCV RBC AUTO: 100 FL (ref 82–98)
METHADONE UR QL: NEGATIVE
MONOCYTES # BLD AUTO: 0.5 THOUSAND/ÂΜL (ref 0.17–1.22)
MONOCYTES NFR BLD AUTO: 10 % (ref 4–12)
NASAL CANNULA: 4
NEUTROPHILS # BLD AUTO: 1.82 THOUSANDS/ÂΜL (ref 1.85–7.62)
NEUTS SEG NFR BLD AUTO: 37 % (ref 43–75)
NRBC BLD AUTO-RTO: 0 /100 WBCS
O2 CT BLDA-SCNC: 17.6 ML/DL (ref 16–23)
OPIATES UR QL SCN: NEGATIVE
OXYCODONE+OXYMORPHONE UR QL SCN: NEGATIVE
OXYHGB MFR BLDA: 93.5 % (ref 94–97)
PCO2 BLDA: 39.6 MM HG (ref 36–44)
PCO2 TEMP ADJ BLDA: 39.4 MM HG (ref 36–44)
PCP UR QL: NEGATIVE
PH BLD: 7.36 [PH] (ref 7.35–7.45)
PH BLDA: 7.36 [PH] (ref 7.35–7.45)
PLATELET # BLD AUTO: 146 THOUSANDS/UL (ref 149–390)
PMV BLD AUTO: 10 FL (ref 8.9–12.7)
PO2 BLD: 92.8 MM HG (ref 75–129)
PO2 BLDA: 93.4 MM HG (ref 75–129)
POTASSIUM SERPL-SCNC: 3.7 MMOL/L (ref 3.5–5.3)
PROT SERPL-MCNC: 6.3 G/DL (ref 6.4–8.4)
RBC # BLD AUTO: 3.68 MILLION/UL (ref 3.81–5.12)
RSV RNA RESP QL NAA+PROBE: NEGATIVE
SARS-COV-2 RNA RESP QL NAA+PROBE: NEGATIVE
SODIUM SERPL-SCNC: 142 MMOL/L (ref 135–147)
SPECIMEN SOURCE: ABNORMAL
THC UR QL: NEGATIVE
WBC # BLD AUTO: 4.89 THOUSAND/UL (ref 4.31–10.16)

## 2024-04-28 PROCEDURE — 96361 HYDRATE IV INFUSION ADD-ON: CPT

## 2024-04-28 PROCEDURE — 82550 ASSAY OF CK (CPK): CPT | Performed by: PHYSICIAN ASSISTANT

## 2024-04-28 PROCEDURE — 71260 CT THORAX DX C+: CPT

## 2024-04-28 PROCEDURE — 96365 THER/PROPH/DIAG IV INF INIT: CPT

## 2024-04-28 PROCEDURE — 83880 ASSAY OF NATRIURETIC PEPTIDE: CPT | Performed by: PHYSICIAN ASSISTANT

## 2024-04-28 PROCEDURE — 93005 ELECTROCARDIOGRAM TRACING: CPT

## 2024-04-28 PROCEDURE — 0241U HB NFCT DS VIR RESP RNA 4 TRGT: CPT | Performed by: PHYSICIAN ASSISTANT

## 2024-04-28 PROCEDURE — 99284 EMERGENCY DEPT VISIT MOD MDM: CPT

## 2024-04-28 PROCEDURE — 70450 CT HEAD/BRAIN W/O DYE: CPT

## 2024-04-28 PROCEDURE — 36415 COLL VENOUS BLD VENIPUNCTURE: CPT | Performed by: PHYSICIAN ASSISTANT

## 2024-04-28 PROCEDURE — 83690 ASSAY OF LIPASE: CPT | Performed by: PHYSICIAN ASSISTANT

## 2024-04-28 PROCEDURE — 83735 ASSAY OF MAGNESIUM: CPT | Performed by: PHYSICIAN ASSISTANT

## 2024-04-28 PROCEDURE — 74177 CT ABD & PELVIS W/CONTRAST: CPT

## 2024-04-28 PROCEDURE — 99284 EMERGENCY DEPT VISIT MOD MDM: CPT | Performed by: PHYSICIAN ASSISTANT

## 2024-04-28 PROCEDURE — 85025 COMPLETE CBC W/AUTO DIFF WBC: CPT | Performed by: PHYSICIAN ASSISTANT

## 2024-04-28 PROCEDURE — 96375 TX/PRO/DX INJ NEW DRUG ADDON: CPT

## 2024-04-28 PROCEDURE — 84484 ASSAY OF TROPONIN QUANT: CPT | Performed by: PHYSICIAN ASSISTANT

## 2024-04-28 PROCEDURE — 96366 THER/PROPH/DIAG IV INF ADDON: CPT

## 2024-04-28 PROCEDURE — 82077 ASSAY SPEC XCP UR&BREATH IA: CPT | Performed by: PHYSICIAN ASSISTANT

## 2024-04-28 PROCEDURE — 82805 BLOOD GASES W/O2 SATURATION: CPT | Performed by: PHYSICIAN ASSISTANT

## 2024-04-28 PROCEDURE — 80053 COMPREHEN METABOLIC PANEL: CPT | Performed by: PHYSICIAN ASSISTANT

## 2024-04-28 PROCEDURE — 80307 DRUG TEST PRSMV CHEM ANLYZR: CPT | Performed by: PHYSICIAN ASSISTANT

## 2024-04-28 PROCEDURE — 36600 WITHDRAWAL OF ARTERIAL BLOOD: CPT

## 2024-04-28 RX ORDER — KETOROLAC TROMETHAMINE 30 MG/ML
15 INJECTION, SOLUTION INTRAMUSCULAR; INTRAVENOUS ONCE
Status: COMPLETED | OUTPATIENT
Start: 2024-04-28 | End: 2024-04-28

## 2024-04-28 RX ORDER — NYSTATIN 100000 U/G
CREAM TOPICAL 2 TIMES DAILY
Qty: 30 G | Refills: 0 | Status: SHIPPED | OUTPATIENT
Start: 2024-04-28 | End: 2024-05-12

## 2024-04-28 RX ORDER — ACYCLOVIR 200 MG/1
400 CAPSULE ORAL ONCE
Status: COMPLETED | OUTPATIENT
Start: 2024-04-28 | End: 2024-04-28

## 2024-04-28 RX ORDER — SODIUM CHLORIDE, SODIUM LACTATE, POTASSIUM CHLORIDE, CALCIUM CHLORIDE 600; 310; 30; 20 MG/100ML; MG/100ML; MG/100ML; MG/100ML
125 INJECTION, SOLUTION INTRAVENOUS CONTINUOUS
Status: DISCONTINUED | OUTPATIENT
Start: 2024-04-28 | End: 2024-04-28 | Stop reason: HOSPADM

## 2024-04-28 RX ADMIN — SODIUM CHLORIDE, SODIUM LACTATE, POTASSIUM CHLORIDE, AND CALCIUM CHLORIDE 125 ML/HR: .6; .31; .03; .02 INJECTION, SOLUTION INTRAVENOUS at 12:38

## 2024-04-28 RX ADMIN — SODIUM CHLORIDE, SODIUM LACTATE, POTASSIUM CHLORIDE, AND CALCIUM CHLORIDE 1000 ML: .6; .31; .03; .02 INJECTION, SOLUTION INTRAVENOUS at 10:43

## 2024-04-28 RX ADMIN — KETOROLAC TROMETHAMINE 15 MG: 30 INJECTION, SOLUTION INTRAMUSCULAR; INTRAVENOUS at 14:19

## 2024-04-28 RX ADMIN — IOHEXOL 100 ML: 350 INJECTION, SOLUTION INTRAVENOUS at 12:26

## 2024-04-28 RX ADMIN — ACYCLOVIR 400 MG: 200 CAPSULE ORAL at 14:19

## 2024-04-28 NOTE — ED PROVIDER NOTES
"History  Chief Complaint   Patient presents with    Alcohol Intoxication     Per EMS Pt found at home intoxicated with toilet paper covering and surrounding her while nude. Pt had large empty bottle of [place] Whiskey next to her and bottle of Isopropyl alcohol missing about 2 inches. Pt reports scalp pain and leg pain following Shingles 1 year ago and is self medicating pain with alcohol. Pt also c/o a \"funky fungus that probably from [place]\" which pt told this RN the doctor would need to look for     Patient is a 59-year-old female with past medical history significant for postherpetic neuralgia related to shingles, anxiety, alcohol use disorder and hypertension who presents for evaluation of acute intoxication.  Per EMS patient was found at home, intoxicated and wrapped in toilet paper.  A bottle of [place] whiskey was next to her and a bottle of isopropyl alcohol with approximately 2 inches missing from the bottle.  She states that she is drinking excessive alcohol because of the pain in her scalp from her postherpetic neuralgia.  She also complains of rash under her breast.    On my exam patient is arousable with stimuli but is unable to participate in medical exam.  She simply repeats \"there is something very wrong with me\".  She denies suicidal or homicidal ideology at this time.      Alcohol Intoxication      Prior to Admission Medications   Prescriptions Last Dose Informant Patient Reported? Taking?   clonazePAM (KlonoPIN) 1 mg tablet  Self, Pharmacy (Specify) Yes No   Sig: Take 1 mg by mouth 2 (two) times a day      Facility-Administered Medications: None       History reviewed. No pertinent past medical history.    History reviewed. No pertinent surgical history.    History reviewed. No pertinent family history.  I have reviewed and agree with the history as documented.    E-Cigarette/Vaping     E-Cigarette/Vaping Substances     Social History     Tobacco Use    Smoking status: Never    Smokeless tobacco: " Never   Substance Use Topics    Alcohol use: Yes     Alcohol/week: 2.0 standard drinks of alcohol     Types: 2 Shots of liquor per week     Comment: daily half bottle whiskey    Drug use: Never       Review of Systems   Unable to perform ROS: Mental status change       Physical Exam  Physical Exam  Vitals and nursing note reviewed.   Constitutional:       General: She is not in acute distress.     Appearance: She is well-developed.   HENT:      Head: Normocephalic and atraumatic.      Right Ear: Ear canal and external ear normal.      Left Ear: Ear canal and external ear normal.      Nose: Nose normal.      Mouth/Throat:      Mouth: Mucous membranes are moist.   Eyes:      General: No scleral icterus.     Conjunctiva/sclera: Conjunctivae normal.      Pupils: Pupils are equal, round, and reactive to light.   Cardiovascular:      Rate and Rhythm: Normal rate and regular rhythm.      Pulses: Normal pulses.      Heart sounds: Normal heart sounds. No murmur heard.  Pulmonary:      Effort: Pulmonary effort is normal. No respiratory distress.      Breath sounds: Normal breath sounds.   Abdominal:      General: Abdomen is flat. Bowel sounds are normal.      Palpations: Abdomen is soft.      Tenderness: There is no abdominal tenderness.   Musculoskeletal:         General: No swelling.      Cervical back: Normal range of motion and neck supple.   Skin:     General: Skin is warm and dry.      Capillary Refill: Capillary refill takes less than 2 seconds.      Findings: Bruising present. No lesion.   Neurological:      General: No focal deficit present.      Mental Status: She is alert. She is disoriented.      Cranial Nerves: No cranial nerve deficit.      Sensory: No sensory deficit.      Motor: No weakness.      Coordination: Coordination normal.      Gait: Gait normal.      Deep Tendon Reflexes: Reflexes normal.   Psychiatric:         Mood and Affect: Mood normal.         Vital Signs  ED Triage Vitals [04/28/24 0958]    Temperature Pulse Respirations Blood Pressure SpO2   98.5 °F (36.9 °C) 84 18 99/63 91 %      Temp Source Heart Rate Source Patient Position - Orthostatic VS BP Location FiO2 (%)   Temporal Monitor Lying Left arm --      Pain Score       10 - Worst Possible Pain           Vitals:    04/28/24 1238 04/28/24 1430 04/28/24 1500 04/28/24 1630   BP: 115/68 125/59  131/75   Pulse: 80 88 88 86   Patient Position - Orthostatic VS: Lying Lying           Visual Acuity      ED Medications  Medications   lactated ringers bolus 1,000 mL (0 mL Intravenous Stopped 4/28/24 1216)     Followed by   lactated ringers infusion (0 mL/hr Intravenous Stopped 4/28/24 1558)   iohexol (OMNIPAQUE) 350 MG/ML injection (MULTI-DOSE) 100 mL (100 mL Intravenous Given 4/28/24 1226)   ketorolac (TORADOL) injection 15 mg (15 mg Intravenous Given 4/28/24 1419)   acyclovir (ZOVIRAX) capsule 400 mg (400 mg Oral Given 4/28/24 1419)       Diagnostic Studies  Results Reviewed       Procedure Component Value Units Date/Time    Rapid drug screen, urine [[id number]]  (Abnormal) Collected: 04/28/24 1545    Lab Status: Final result Specimen: Urine Updated: 04/28/24 1741     Amph/Meth UR Negative     Barbiturate Ur Positive     Benzodiazepine Urine Negative     Cocaine Urine Negative     Methadone Urine Negative     Opiate Urine Negative     PCP Ur Negative     THC Urine Negative     Oxycodone Urine Negative     Fentanyl Urine Negative     HYDROCODONE URINE Negative    Narrative:      Presumptive report. If requested, specimen will be sent to reference lab for confirmation.  FOR MEDICAL PURPOSES ONLY.   IF CONFIRMATION NEEDED PLEASE CONTACT THE LAB WITHIN 5 DAYS.    Drug Screen Cutoff Levels:  AMPHETAMINE/METHAMPHETAMINES  1000 ng/mL  BARBITURATES     200 ng/mL  BENZODIAZEPINES     200 ng/mL  COCAINE      300 ng/mL  METHADONE      300 ng/mL  OPIATES      300 ng/mL  PHENCYCLIDINE     25 ng/mL  THC       50 ng/mL  OXYCODONE      100 ng/mL  FENTANYL      5  ng/mL  HYDROCODONE     300 ng/mL    FLU/RSV/COVID - if FLU/RSV clinically relevant [[id number]]  (Normal) Collected: 04/28/24 1020    Lab Status: Final result Specimen: Nares from Nose Updated: 04/28/24 1105     SARS-CoV-2 Negative     INFLUENZA A PCR Negative     INFLUENZA B PCR Negative     RSV PCR Negative    Narrative:      FOR PEDIATRIC PATIENTS - copy/paste COVID Guidelines URL to browser: [email or web address]    SARS-CoV-2 assay is a Nucleic Acid Amplification assay intended for the  qualitative detection of nucleic acid from SARS-CoV-2 in nasopharyngeal  swabs. Results are for the presumptive identification of SARS-CoV-2 RNA.    Positive results are indicative of infection with SARS-CoV-2, the virus  causing COVID-19, but do not rule out bacterial infection or co-infection  with other viruses. Laboratories within the [place] and its  territories are required to report all positive results to the appropriate  public health authorities. Negative results do not preclude SARS-CoV-2  infection and should not be used as the sole basis for treatment or other  patient management decisions. Negative results must be combined with  clinical observations, patient history, and epidemiological information.  This test has not been FDA cleared or approved.    This test has been authorized by FDA under an Emergency Use Authorization  (EUA). This test is only authorized for the duration of time the  declaration that circumstances exist justifying the authorization of the  emergency use of an in vitro diagnostic tests for detection of SARS-CoV-2  virus and/or diagnosis of COVID-19 infection under section 564(b)(1) of  the Act, 21 U.S.C. 360bbb-3(b)(1), unless the authorization is terminated  or revoked sooner. The test has been validated but independent review by FDA  and CLIA is pending.    Test performed using [employer]: This RT-PCR assay targets N2,  a  region unique to SARS-CoV-2. A conserved region in the E-gene was chosen  for pan-Sarbecovirus detection which includes SARS-CoV-2.    According to CMS-2020-01-R, this platform meets the definition of high-throughput technology.    HS Troponin 0hr (reflex protocol) [[id number]]  (Normal) Collected: 04/28/24 1020    Lab Status: Final result Specimen: Blood from Arm, Right Updated: 04/28/24 1054     hs TnI 0hr 3 ng/L     B-Type Natriuretic Peptide(BNP) [[id number]]  (Normal) Collected: 04/28/24 1020    Lab Status: Final result Specimen: Blood from Arm, Right Updated: 04/28/24 1053     BNP 72 pg/mL     Ethanol [[id number]]  (Abnormal) Collected: 04/28/24 1020    Lab Status: Final result Specimen: Blood from Arm, Right Updated: 04/28/24 1045     Ethanol Lvl 306 mg/dL     Comprehensive metabolic panel [[id number]]  (Abnormal) Collected: 04/28/24 1020    Lab Status: Final result Specimen: Blood from Arm, Right Updated: 04/28/24 1045     Sodium 142 mmol/L      Potassium 3.7 mmol/L      Chloride 109 mmol/L      CO2 26 mmol/L      ANION GAP 7 mmol/L      BUN 16 mg/dL      Creatinine 0.56 mg/dL      Glucose 108 mg/dL      Calcium 8.6 mg/dL      AST 26 U/L      ALT 17 U/L      Alkaline Phosphatase 85 U/L      Total Protein 6.3 g/dL      Albumin 3.6 g/dL      Total Bilirubin 0.23 mg/dL      eGFR 100 ml/min/1.73sq m     Narrative:      National Kidney Disease Foundation guidelines for Chronic Kidney Disease (CKD):     Stage 1 with normal or high GFR (GFR > 90 mL/min/1.73 square meters)    Stage 2 Mild CKD (GFR = 60-89 mL/min/1.73 square meters)    Stage 3A Moderate CKD (GFR = 45-59 mL/min/1.73 square meters)    Stage 3B Moderate CKD (GFR = 30-44 mL/min/1.73 square meters)    Stage 4 Severe CKD (GFR = 15-29 mL/min/1.73 square meters)    Stage 5 End Stage CKD (GFR <15 mL/min/1.73 square meters)  Note: GFR calculation is accurate only with a steady state creatinine    Lipase [[id number]]  (Normal) Collected: 04/28/24 1020    Lab Status:  Final result Specimen: Blood from Arm, Right Updated: 04/28/24 1045     Lipase 18 u/L     Magnesium [[id number]]  (Abnormal) Collected: 04/28/24 1020    Lab Status: Final result Specimen: Blood from Arm, Right Updated: 04/28/24 1045     Magnesium 1.7 mg/dL     CK [[id number]]  (Normal) Collected: 04/28/24 1020    Lab Status: Final result Specimen: Blood from Arm, Right Updated: 04/28/24 1045     Total CK 38 U/L     Blood gas, arterial [[id number]]  (Abnormal) Collected: 04/28/24 1034    Lab Status: Final result Specimen: Blood, Arterial from Radial, Right Updated: 04/28/24 1042     pH, Arterial 7.356     PH ART TC 7.357     pCO2, Arterial 39.6 mm Hg      PCO2 (TC) Arterial 39.4 mm Hg      pO2, Arterial 93.4 mm Hg      PO2 (TC) Arterial 92.8 mm Hg      HCO3, Arterial 21.7 mmol/L      Base Excess, Arterial -3.5 mmol/L      O2 Content, Arterial 17.6 mL/dL      O2 HGB,Arterial  93.5 %      SOURCE Radial, Right     ANKIT TEST Yes     Temperature 98.5 Degrees Fehrenheit      Nasal Cannula 4    CBC and differential [[id number]]  (Abnormal) Collected: 04/28/24 1020    Lab Status: Final result Specimen: Blood from Arm, Right Updated: 04/28/24 1027     WBC 4.89 Thousand/uL      RBC 3.68 Million/uL      Hemoglobin 12.6 g/dL      Hematocrit 36.8 %       fL      MCH 34.2 pg      MCHC 34.2 g/dL      RDW 12.1 %      MPV 10.0 fL      Platelets 146 Thousands/uL      nRBC 0 /100 WBCs      Segmented % 37 %      Immature Grans % 0 %      Lymphocytes % 48 %      Monocytes % 10 %      Eosinophils Relative 4 %      Basophils Relative 1 %      Absolute Neutrophils 1.82 Thousands/µL      Absolute Immature Grans 0.01 Thousand/uL      Absolute Lymphocytes 2.30 Thousands/µL      Absolute Monocytes 0.50 Thousand/µL      Eosinophils Absolute 0.21 Thousand/µL      Basophils Absolute 0.05 Thousands/µL                    CT chest abdomen pelvis w contrast   Final Result by Cristhian Townsend MD (04/28 1313)      No acute findings in the  chest, abdomen or pelvis.               Workstation performed: [id number]         CT head without contrast   Final Result by Cristhian Townsend MD (04/28 1301)      No acute intracranial abnormality.                  Workstation performed: [id number]                    Procedures  Procedures         ED Course  ED Course as of 04/28/24 1800   Sun Apr 28, 2024   1250 Estimated ETOH clearance 1 am                               SBIRT 20yo+      Flowsheet Row Most Recent Value   Initial Alcohol Screen: US AUDIT-C     1. How often do you have a drink containing alcohol? 6 Filed at: 04/28/2024 1001   2. How many drinks containing alcohol do you have on a typical day you are drinking?  6 Filed at: 04/28/2024 1001   3b. FEMALE Any Age, or MALE 65+: How often do you have 4 or more drinks on one occassion? 6 Filed at: 04/28/2024 1001   Audit-C Score 18 Filed at: 04/28/2024 1001   Full Alcohol Screen: US AUDIT    4. How often during the last year have you found that you were not able to stop drinking once you had started? 4 Filed at: 04/28/2024 1001   5. How often during past year have you failed to do what was normally expected of you because of drinking?  4 Filed at: 04/28/2024 1001   6. How often in past year have you needed a first drink in the morning to get yourself going after a heavy drinking session?  4 Filed at: 04/28/2024 1001   7. How often in past year have you had feeling of guilt or remorse after drinking?  0 Filed at: 04/28/2024 1001   8. How often in past year have you been unable to remember what happened night before because you had been drinking?  0 Filed at: 04/28/2024 1001   9. Have you or someone else been injured as a result of your drinking?  0 Filed at: 04/28/2024 1001   10. Has a relative, friend, doctor or other health worker been concerned about your drinking and suggested you cut down?  4 Filed at: 04/28/2024 1001   AUDIT Total Score 34 Filed at: 04/28/2024 1001   KELLEN: How many times in  the past year have you...    Used an illegal drug or used a prescription medication for non-medical reasons? Never Filed at: 04/28/2024 1001                      Medical Decision Making  Problems Addressed:  Alcoholic intoxication without complication (HCC): acute illness or injury     Details: Patient with acute alcohol intoxication and possible isopropyl alcohol ingestion  Blood alcohol level 306  Patient has nobody to come and pick her up to facilitate a safe discharge  Sobriety should be achieved approximately 1 AM based on standard alcohol clearance  Imaging unremarkable  Laboratory evaluation unremarkable    Amount and/or Complexity of Data Reviewed  Labs: ordered.  Radiology: ordered.    Risk  Prescription drug management.             Disposition  Final diagnoses:   Alcoholic intoxication without complication (HCC)     Time reflects when diagnosis was documented in both MDM as applicable and the Disposition within this note       Time User Action Codes Description Comment    4/28/2024  5:57 PM Jolie Fitzgerald Add [F10.920] Alcoholic intoxication without complication (HCC)           ED Disposition       None          Follow-up Information    None         Patient's Medications   Discharge Prescriptions    No medications on file       No discharge procedures on file.    PDMP Review         Value Time User    PDMP Reviewed  Yes 9/26/2023  1:05 PM Ophelia Dickson MD            ED Provider  Electronically Signed by             Jolie Fitzgerald PA-C  04/28/24 1800

## 2024-04-29 LAB
ATRIAL RATE: 84 BPM
P AXIS: 39 DEGREES
PR INTERVAL: 210 MS
QRS AXIS: 20 DEGREES
QRSD INTERVAL: 96 MS
QT INTERVAL: 416 MS
QTC INTERVAL: 488 MS
T WAVE AXIS: 22 DEGREES
VENTRICULAR RATE: 83 BPM

## 2024-04-29 PROCEDURE — 93010 ELECTROCARDIOGRAM REPORT: CPT | Performed by: INTERNAL MEDICINE
